# Patient Record
Sex: FEMALE | Race: WHITE
[De-identification: names, ages, dates, MRNs, and addresses within clinical notes are randomized per-mention and may not be internally consistent; named-entity substitution may affect disease eponyms.]

---

## 2017-04-19 ENCOUNTER — HOSPITAL ENCOUNTER (OUTPATIENT)
Dept: HOSPITAL 68 - STS | Age: 82
End: 2017-04-19
Payer: COMMERCIAL

## 2017-04-19 VITALS — HEIGHT: 55 IN | BODY MASS INDEX: 37.03 KG/M2 | WEIGHT: 160 LBS

## 2017-04-19 DIAGNOSIS — H25.9: Primary | ICD-10-CM

## 2017-04-19 DIAGNOSIS — I10: ICD-10-CM

## 2017-04-19 DIAGNOSIS — Z79.84: ICD-10-CM

## 2017-04-19 DIAGNOSIS — E11.8: ICD-10-CM

## 2017-04-19 PROCEDURE — V2632 POST CHMBR INTRAOCULAR LENS: HCPCS

## 2017-04-19 NOTE — OPERATIVE REPORT
Operative/Inv Procedure Report
Surgery Date: 04/19/17
Name of Procedure:
Cataract extraction lens implantation right eye
Pre-Operative Diagnosis:
Age-related cataract right eye 20/25 vision 20/60 glare vision
Post-Operative Diagnosis:
Same
Estimated Blood Loss: none
Surgeon/Assistant:
ALEKSANDR CLAUDIO,ANNITA BALTZAAR
 
Anesthesia: local monitored anesthesi
Complications:
None
 
Operative/Procedure Note
Note:
The patient was brought to the operating room standard monitoring equipment was 
attached the patient was prepped and draped in the usual fashion for intraocular
surgery.  A lid speculum was placed to retract the lids.  The case was begun by 
making 2 partial-thickness corneal relaxing incisions at 85.  A temporal 
incision with a 2.4 mm keratome.  The eye was stabilized with a Dennison ring 
during this incision.  1 mL of non-preserved lidocaine was introduced into the 
anterior chamber to provide anesthesia.  The anterior chamber was then filled 
and deepened with viscoelastic.  A curvilinear capsulorrhexis was achieved using
a 30-gauge needle and is a cystotome and capsulorrhexis was finished using a 
Utrata forceps.  A second or paracentesis incision was made temporally with a 1 
mm MVR blade.  The lens was then hydrodissected with balanced salt solution and 
found to be rotatable.  The lens was emulsified using phacoemulsification and a 
modified four-quadrant cracking technique.  The residual cortical material was 
removed using automated irrigation and aspiration and as much of the anterior 
capsular rim was cleaned as well as possible.  The posterior capsule was cleaned
first with the automated machine on a low setting and then manually with a Isac
squeegee.  The capsular bag was deepened with viscoelastic.  The lens a Akreos 
AO60 20.5  Diopter placed into the bag under direct visualization and rotated so
that the haptics were at 12 and 6:00.  Viscoelastic was then removed from the 
eye by flushing it out and then by automated irrigation and aspiration.  The eye
was pressurized to a normal tone.  1/10 of a cc of vancomycin solution was 
introduced into the anterior chamber to provide antibiotic prophylaxis.  The 
wounds were sealed by hydrating the stroma adjacent to them and the eye was left
at a proper tone after the wounds were checked and found not to be leaking.  The
lid speculum was removed from the orbit.  Antibiotic and steroid drops were 
placed on the eye and then the eye was shielded.  Monitoring equipment was 
removed from the patient and the patient was removed from the operative suite to
the holding area.  The patient tolerated the procedure well and will be seen in 
the office tomorrow.

## 2018-08-20 ENCOUNTER — HOSPITAL ENCOUNTER (EMERGENCY)
Dept: HOSPITAL 68 - ERH | Age: 83
Discharge: ANOTHER HOSPITAL | End: 2018-08-20
Payer: COMMERCIAL

## 2018-08-20 VITALS — DIASTOLIC BLOOD PRESSURE: 90 MMHG | SYSTOLIC BLOOD PRESSURE: 184 MMHG

## 2018-08-20 VITALS — HEIGHT: 56 IN | WEIGHT: 140 LBS | BODY MASS INDEX: 31.49 KG/M2

## 2018-08-20 DIAGNOSIS — E11.9: ICD-10-CM

## 2018-08-20 DIAGNOSIS — M54.5: ICD-10-CM

## 2018-08-20 DIAGNOSIS — R07.9: ICD-10-CM

## 2018-08-20 DIAGNOSIS — S12.000A: Primary | ICD-10-CM

## 2018-08-20 DIAGNOSIS — I10: ICD-10-CM

## 2018-08-20 DIAGNOSIS — R51: ICD-10-CM

## 2018-08-20 DIAGNOSIS — S22.31XA: ICD-10-CM

## 2018-08-20 LAB
ABSOLUTE GRANULOCYTE CT: 8 /CUMM (ref 1.4–6.5)
BASOPHILS # BLD: 0 /CUMM (ref 0–0.2)
BASOPHILS NFR BLD: 0.1 % (ref 0–2)
EOSINOPHIL # BLD: 0 /CUMM (ref 0–0.7)
EOSINOPHIL NFR BLD: 0.4 % (ref 0–5)
ERYTHROCYTE [DISTWIDTH] IN BLOOD BY AUTOMATED COUNT: 14.3 % (ref 11.5–14.5)
GRANULOCYTES NFR BLD: 87.4 % (ref 42.2–75.2)
HCT VFR BLD CALC: 43.9 % (ref 37–47)
LYMPHOCYTES # BLD: 0.9 /CUMM (ref 1.2–3.4)
MCH RBC QN AUTO: 31.6 PG (ref 27–31)
MCHC RBC AUTO-ENTMCNC: 33 G/DL (ref 33–37)
MCV RBC AUTO: 95.8 FL (ref 81–99)
MONOCYTES # BLD: 0.2 /CUMM (ref 0.1–0.6)
PLATELET # BLD: 235 /CUMM (ref 130–400)
PMV BLD AUTO: 7.1 FL (ref 7.4–10.4)
RED BLOOD CELL CT: 4.58 /CUMM (ref 4.2–5.4)
WBC # BLD AUTO: 9.2 /CUMM (ref 4.8–10.8)

## 2018-08-20 PROCEDURE — 72141 MRI NECK SPINE W/O DYE: CPT
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
